# Patient Record
Sex: MALE | Race: WHITE | URBAN - METROPOLITAN AREA
[De-identification: names, ages, dates, MRNs, and addresses within clinical notes are randomized per-mention and may not be internally consistent; named-entity substitution may affect disease eponyms.]

---

## 2018-02-13 ENCOUNTER — HOSPITAL ENCOUNTER (OUTPATIENT)
Dept: ENDOSCOPY | Age: 63
Discharge: HOME | End: 2018-02-13
Attending: INTERNAL MEDICINE | Admitting: INTERNAL MEDICINE

## 2018-02-13 NOTE — ANESTHESIA POST-OP
Anesthesia Post-op Note    Patient: Sid Rockwell  MRN: [id number]  YOB: 1955  Date of evaluation: 2/13/2018  Time:  12:13 PM     Procedure(s) Performed:     Last Vitals: There were no vitals taken for this visit.     Clarissa Phase I:      Clarissa Phase II:      Anesthesia Post Evaluation    Final anesthesia type: TIVA  Patient location during evaluation: PACU  Patient participation: complete - patient participated  Level of consciousness: awake and alert  Airway patency: patent  Nausea & Vomiting: no vomiting  Complications: no  Cardiovascular status: hemodynamically stable  Respiratory status: acceptable  Hydration status: euvolemic        Chen Navarrete MD  12:13 PM